# Patient Record
Sex: FEMALE | Race: OTHER | NOT HISPANIC OR LATINO
[De-identification: names, ages, dates, MRNs, and addresses within clinical notes are randomized per-mention and may not be internally consistent; named-entity substitution may affect disease eponyms.]

---

## 2023-12-12 ENCOUNTER — TRANSCRIPTION ENCOUNTER (OUTPATIENT)
Age: 23
End: 2023-12-12

## 2023-12-12 ENCOUNTER — ASOB RESULT (OUTPATIENT)
Age: 23
End: 2023-12-12

## 2023-12-12 ENCOUNTER — APPOINTMENT (OUTPATIENT)
Dept: ANTEPARTUM | Facility: CLINIC | Age: 23
End: 2023-12-12
Payer: MEDICAID

## 2023-12-12 PROCEDURE — 93976 VASCULAR STUDY: CPT

## 2023-12-12 PROCEDURE — 76813 OB US NUCHAL MEAS 1 GEST: CPT

## 2024-02-13 ENCOUNTER — APPOINTMENT (OUTPATIENT)
Dept: ANTEPARTUM | Facility: CLINIC | Age: 24
End: 2024-02-13
Payer: MEDICAID

## 2024-02-13 ENCOUNTER — ASOB RESULT (OUTPATIENT)
Age: 24
End: 2024-02-13

## 2024-02-13 PROCEDURE — 76817 TRANSVAGINAL US OBSTETRIC: CPT

## 2024-02-13 PROCEDURE — 76811 OB US DETAILED SNGL FETUS: CPT

## 2024-02-20 ENCOUNTER — APPOINTMENT (OUTPATIENT)
Dept: ANTEPARTUM | Facility: CLINIC | Age: 24
End: 2024-02-20
Payer: MEDICAID

## 2024-02-20 ENCOUNTER — ASOB RESULT (OUTPATIENT)
Age: 24
End: 2024-02-20

## 2024-02-20 PROCEDURE — 59000 AMNIOCENTESIS DIAGNOSTIC: CPT

## 2024-02-20 PROCEDURE — 76946 ECHO GUIDE FOR AMNIOCENTESIS: CPT

## 2024-02-27 ENCOUNTER — APPOINTMENT (OUTPATIENT)
Dept: PEDIATRIC CARDIOLOGY | Facility: CLINIC | Age: 24
End: 2024-02-27
Payer: MEDICAID

## 2024-02-27 PROBLEM — Z00.00 ENCOUNTER FOR PREVENTIVE HEALTH EXAMINATION: Status: ACTIVE | Noted: 2024-02-27

## 2024-02-27 PROCEDURE — 76827 ECHO EXAM OF FETAL HEART: CPT

## 2024-02-27 PROCEDURE — 76820 UMBILICAL ARTERY ECHO: CPT | Mod: 26

## 2024-02-27 PROCEDURE — 76821 MIDDLE CEREBRAL ARTERY ECHO: CPT | Mod: 26

## 2024-02-27 PROCEDURE — 99202 OFFICE O/P NEW SF 15 MIN: CPT

## 2024-02-27 PROCEDURE — 76825 ECHO EXAM OF FETAL HEART: CPT

## 2024-03-29 ENCOUNTER — APPOINTMENT (OUTPATIENT)
Dept: ANTEPARTUM | Facility: CLINIC | Age: 24
End: 2024-03-29

## 2024-03-29 ENCOUNTER — ASOB RESULT (OUTPATIENT)
Age: 24
End: 2024-03-29

## 2024-03-29 PROCEDURE — 76819 FETAL BIOPHYS PROFIL W/O NST: CPT | Mod: 1L

## 2024-03-29 PROCEDURE — 76816 OB US FOLLOW-UP PER FETUS: CPT | Mod: 1L

## 2024-03-29 PROCEDURE — 76820 UMBILICAL ARTERY ECHO: CPT | Mod: 1L,59

## 2024-04-26 ENCOUNTER — APPOINTMENT (OUTPATIENT)
Dept: ANTEPARTUM | Facility: CLINIC | Age: 24
End: 2024-04-26
Payer: MEDICAID

## 2024-04-26 ENCOUNTER — ASOB RESULT (OUTPATIENT)
Age: 24
End: 2024-04-26

## 2024-04-26 PROCEDURE — 76816 OB US FOLLOW-UP PER FETUS: CPT

## 2024-04-26 PROCEDURE — 76819 FETAL BIOPHYS PROFIL W/O NST: CPT

## 2024-04-26 PROCEDURE — 76820 UMBILICAL ARTERY ECHO: CPT | Mod: 59

## 2024-05-21 ENCOUNTER — OUTPATIENT (OUTPATIENT)
Dept: OUTPATIENT SERVICES | Facility: HOSPITAL | Age: 24
LOS: 1 days | End: 2024-05-21
Payer: COMMERCIAL

## 2024-05-21 VITALS
RESPIRATION RATE: 18 BRPM | HEART RATE: 124 BPM | OXYGEN SATURATION: 98 % | TEMPERATURE: 98 F | DIASTOLIC BLOOD PRESSURE: 83 MMHG | SYSTOLIC BLOOD PRESSURE: 152 MMHG

## 2024-05-21 DIAGNOSIS — O26.899 OTHER SPECIFIED PREGNANCY RELATED CONDITIONS, UNSPECIFIED TRIMESTER: ICD-10-CM

## 2024-05-21 LAB
ALBUMIN SERPL ELPH-MCNC: 3.9 G/DL — SIGNIFICANT CHANGE UP (ref 3.3–5)
ALP SERPL-CCNC: 158 U/L — HIGH (ref 40–120)
ALT FLD-CCNC: 10 U/L — SIGNIFICANT CHANGE UP (ref 10–45)
ANION GAP SERPL CALC-SCNC: 11 MMOL/L — SIGNIFICANT CHANGE UP (ref 5–17)
APTT BLD: 26 SEC — SIGNIFICANT CHANGE UP (ref 24.5–35.6)
AST SERPL-CCNC: 12 U/L — SIGNIFICANT CHANGE UP (ref 10–40)
BASOPHILS # BLD AUTO: 0.04 K/UL — SIGNIFICANT CHANGE UP (ref 0–0.2)
BASOPHILS NFR BLD AUTO: 0.3 % — SIGNIFICANT CHANGE UP (ref 0–2)
BILIRUB SERPL-MCNC: 0.2 MG/DL — SIGNIFICANT CHANGE UP (ref 0.2–1.2)
BLD GP AB SCN SERPL QL: NEGATIVE — SIGNIFICANT CHANGE UP
BUN SERPL-MCNC: 9 MG/DL — SIGNIFICANT CHANGE UP (ref 7–23)
CALCIUM SERPL-MCNC: 9.8 MG/DL — SIGNIFICANT CHANGE UP (ref 8.4–10.5)
CHLORIDE SERPL-SCNC: 105 MMOL/L — SIGNIFICANT CHANGE UP (ref 96–108)
CO2 SERPL-SCNC: 20 MMOL/L — LOW (ref 22–31)
CREAT ?TM UR-MCNC: 169 MG/DL — SIGNIFICANT CHANGE UP
CREAT SERPL-MCNC: 0.46 MG/DL — LOW (ref 0.5–1.3)
EGFR: 136 ML/MIN/1.73M2 — SIGNIFICANT CHANGE UP
EOSINOPHIL # BLD AUTO: 0.16 K/UL — SIGNIFICANT CHANGE UP (ref 0–0.5)
EOSINOPHIL NFR BLD AUTO: 1.1 % — SIGNIFICANT CHANGE UP (ref 0–6)
FIBRINOGEN PPP-MCNC: 464 MG/DL — HIGH (ref 200–445)
GLUCOSE SERPL-MCNC: 99 MG/DL — SIGNIFICANT CHANGE UP (ref 70–99)
HCT VFR BLD CALC: 34.2 % — LOW (ref 34.5–45)
HGB BLD-MCNC: 11.1 G/DL — LOW (ref 11.5–15.5)
IMM GRANULOCYTES NFR BLD AUTO: 1.1 % — HIGH (ref 0–0.9)
INR BLD: 0.9 — SIGNIFICANT CHANGE UP (ref 0.85–1.18)
LDH SERPL L TO P-CCNC: 162 U/L — SIGNIFICANT CHANGE UP (ref 50–242)
LYMPHOCYTES # BLD AUTO: 17.9 % — SIGNIFICANT CHANGE UP (ref 13–44)
LYMPHOCYTES # BLD AUTO: 2.69 K/UL — SIGNIFICANT CHANGE UP (ref 1–3.3)
MCHC RBC-ENTMCNC: 27 PG — SIGNIFICANT CHANGE UP (ref 27–34)
MCHC RBC-ENTMCNC: 32.5 GM/DL — SIGNIFICANT CHANGE UP (ref 32–36)
MCV RBC AUTO: 83.2 FL — SIGNIFICANT CHANGE UP (ref 80–100)
MONOCYTES # BLD AUTO: 1.04 K/UL — HIGH (ref 0–0.9)
MONOCYTES NFR BLD AUTO: 6.9 % — SIGNIFICANT CHANGE UP (ref 2–14)
NEUTROPHILS # BLD AUTO: 10.96 K/UL — HIGH (ref 1.8–7.4)
NEUTROPHILS NFR BLD AUTO: 72.7 % — SIGNIFICANT CHANGE UP (ref 43–77)
NRBC # BLD: 0 /100 WBCS — SIGNIFICANT CHANGE UP (ref 0–0)
PLATELET # BLD AUTO: 315 K/UL — SIGNIFICANT CHANGE UP (ref 150–400)
POTASSIUM SERPL-MCNC: 3.6 MMOL/L — SIGNIFICANT CHANGE UP (ref 3.5–5.3)
POTASSIUM SERPL-SCNC: 3.6 MMOL/L — SIGNIFICANT CHANGE UP (ref 3.5–5.3)
PROT ?TM UR-MCNC: 31 MG/DL — HIGH (ref 0–12)
PROT SERPL-MCNC: 6.9 G/DL — SIGNIFICANT CHANGE UP (ref 6–8.3)
PROT/CREAT UR-RTO: 0.2 RATIO — SIGNIFICANT CHANGE UP (ref 0–0.2)
PROTHROM AB SERPL-ACNC: 10.3 SEC — SIGNIFICANT CHANGE UP (ref 9.5–13)
RBC # BLD: 4.11 M/UL — SIGNIFICANT CHANGE UP (ref 3.8–5.2)
RBC # FLD: 15.4 % — HIGH (ref 10.3–14.5)
RH IG SCN BLD-IMP: POSITIVE — SIGNIFICANT CHANGE UP
RH IG SCN BLD-IMP: POSITIVE — SIGNIFICANT CHANGE UP
SODIUM SERPL-SCNC: 136 MMOL/L — SIGNIFICANT CHANGE UP (ref 135–145)
URATE SERPL-MCNC: 3.5 MG/DL — SIGNIFICANT CHANGE UP (ref 2.5–7)
WBC # BLD: 15.06 K/UL — HIGH (ref 3.8–10.5)
WBC # FLD AUTO: 15.06 K/UL — HIGH (ref 3.8–10.5)

## 2024-05-21 PROCEDURE — 59025 FETAL NON-STRESS TEST: CPT

## 2024-05-21 PROCEDURE — 93005 ELECTROCARDIOGRAM TRACING: CPT

## 2024-05-21 PROCEDURE — 85025 COMPLETE CBC W/AUTO DIFF WBC: CPT

## 2024-05-21 PROCEDURE — 80053 COMPREHEN METABOLIC PANEL: CPT

## 2024-05-21 PROCEDURE — 86780 TREPONEMA PALLIDUM: CPT

## 2024-05-21 PROCEDURE — 82570 ASSAY OF URINE CREATININE: CPT

## 2024-05-21 PROCEDURE — 76818 FETAL BIOPHYS PROFILE W/NST: CPT

## 2024-05-21 PROCEDURE — 86850 RBC ANTIBODY SCREEN: CPT

## 2024-05-21 PROCEDURE — 85384 FIBRINOGEN ACTIVITY: CPT

## 2024-05-21 PROCEDURE — 86900 BLOOD TYPING SEROLOGIC ABO: CPT

## 2024-05-21 PROCEDURE — 85730 THROMBOPLASTIN TIME PARTIAL: CPT

## 2024-05-21 PROCEDURE — 84550 ASSAY OF BLOOD/URIC ACID: CPT

## 2024-05-21 PROCEDURE — 99214 OFFICE O/P EST MOD 30 MIN: CPT

## 2024-05-21 PROCEDURE — 85610 PROTHROMBIN TIME: CPT

## 2024-05-21 PROCEDURE — 83615 LACTATE (LD) (LDH) ENZYME: CPT

## 2024-05-21 PROCEDURE — 86901 BLOOD TYPING SEROLOGIC RH(D): CPT

## 2024-05-21 PROCEDURE — 84156 ASSAY OF PROTEIN URINE: CPT

## 2024-05-21 PROCEDURE — 36415 COLL VENOUS BLD VENIPUNCTURE: CPT

## 2024-05-21 NOTE — OB PROVIDER TRIAGE NOTE - HISTORY OF PRESENT ILLNESS
Subjective:  HPI: 24y Female  at 35w and 6d who was sent by Dr. Dia for elevated BP in office. This morning around 10:00 AM patient went for routine prenatal visit and was found to have BP of 146/80's.  She reports having regular, rhythmic back pain that feels like contractions. She denies vision changes, headaches.    - Fetal movement: Active movement, unchanged from past week  - Contractions: Yes, pt reports back pain that feels like contractions  - Leakage of fluid: None  - Vaginal bleeding: None    Antepartum:  - Pregnancy type: Spontaneous  - Testing: NIPT wnl  - Anatomy scan: wnl  - GCT/GTT: Passed  - Hypertensive disorders of pregnancy: None documented  - GBS status:  - EFW:    - ObHx:   - GynHx: denies  - PMH: denies  - PSH: denies  - SH: denies toxic habits  - Meds: Prenatal vitamins  - Allergies: Allergy Status Unknown          Objective:    T(C): 36.6 (24 @ 13:02), Max: 36.6 (24 @ 13:02)  HR: 124 (24 @ 13:02) (124 - 124)  BP: 152/83 (24 @ 13:02) (152/83 - 152/83)  RR: 18 (24 @ 13:02) (18 - 18)  SpO2: 98% (24 @ 13:02) (98% - 98%)    General: No apparent distress; Lying comfortably in bed  Pulmonary: No increased work of breathing  Abdomen: Soft; Nontender; Gravid  Extremities: Trace pedal edema bilaterally; No calf tenderness bilaterally  Neurological: Gross movements intact  Psychiatric: Appropriate mood and affect  Skin: No rashes or jaundice    SVE:   TAUS:   (sonogram attached)  TVUS:   (sonogram attached)    FHT:   TOCO:     Labs / Studies:                      Assessment:        Plan:  - Admit to L&D floor  - Labs sent:  - Diet:  - Continuous electronic fetal monitoring and tocometry  - Pain management with  - GBS status as above:   - All questions were answered and concerns addressed. Patient verbally expressed understanding.  - Discussed with senior resident   - Discussed with attending physician     Subjective:  HPI: 24y Female  at 35w and 6d who was sent by Dr. Dia for elevated BP in office. This morning around 10:00 AM patient went for routine prenatal visit and was found to have BP of 146/80's mmHg. Since early pregnancy she states that she has had high BPs and recalls 24-hour urine collection, which was normal. She has been taking daily readings at home and has not seen any values above 160/110 mmHg. Patient reports high BP during last pregnancy in , but does not believe she was administered Mg. She reports having regular, rhythmic back pain that feels like contractions. She denies vision changes, headaches, RUQ pain, or shortness of breath.         - Fetal movement: Active movement, unchanged from past week  - Contractions: Yes, pt reports back pain that feels like contractions  - Leakage of fluid: None  - Vaginal bleeding: None    Antepartum:  - Pregnancy type: Spontaneous  - Testing: NIPT wnl  - Anatomy scan: wnl  - GCT/GTT: Passed  - Hypertensive disorders of pregnancy: Yes throughout this pregnancy and previous pregnancy in , never above 160/110  - GBS status: Unknown, but drawn by Dr. Dia  - EFW: 1810g at 32 weeks    - ObHx: G1)  induced vaginal delivery at 37w 3700g G2) current  - GynHx: Tested positive for herpes simplex virus on routine screening, denies eruptions. Given Valtrex PO, but only took one dose 3 days ago.  - PMH: denies  - PSH: denies  - SH: denies toxic habits  - Meds: Prenatal vitamins  - Allergies: NKA          Objective:    T(C): 36.6 (24 @ 13:02), Max: 36.6 (24 @ 13:02)  HR: 124 (24 @ 13:02) (124 - 124)  BP: 152/83 (24 @ 13:02) (152/83 - 152/83)  RR: 18 (24 @ 13:02) (18 - 18)  SpO2: 98% (24 @ 13:02) (98% - 98%)    General: No apparent distress; Lying comfortably in bed  Pulmonary: No increased work of breathing  Abdomen: Soft; Nontender; Gravid  Extremities: No pedal edema bilaterally; No calf tenderness bilaterally  Neurological: Gross movements intact  Psychiatric: Appropriate mood and affect  Skin: No rashes or jaundice    SVE:   TAUS: Cephalic, anterior placenta. BPP 8/8, LISSET 13.5   (sonogram attached)      FHT: baseline 150, moderate variability, +accels, -deccels, overall reactive and reassuring  TOCO: Uterine irregularity vs. spontaneous uterine contractions    Labs / Studies:                      Assessment: This is a 24y Female  at 35w and 6d sent by Dr. Dia for BP concerns in office. Repeat vital signs in L&D reveals elevated BPs highest of 153/82, which combined with         Plan:  - Admit to L&D floor  - Labs sent:  - Diet:  - Continuous electronic fetal monitoring and tocometry  - Pain management with  - GBS status as above:   - All questions were answered and concerns addressed. Patient verbally expressed understanding.  - Discussed with senior resident   - Discussed with attending physician     Subjective:  HPI: 24y Female  at 35w and 6d who was sent by Dr. Dia for elevated BP in office. This morning around 10:00 AM patient went for routine prenatal visit and was found to have BP of 146/80's mmHg. Since early pregnancy she states that she has had high BPs and recalls 24-hour urine collection, which was normal. She has been taking daily readings at home and has not seen any values above 160/110 mmHg. Patient reports high BP during last pregnancy in , but does not believe she was administered Mg. She reports having regular, rhythmic back pain that feels like contractions. She denies vision changes, headaches, RUQ pain, or shortness of breath.         - Fetal movement: Active movement, unchanged from past week  - Contractions: Yes, pt reports back pain that feels like contractions  - Leakage of fluid: None  - Vaginal bleeding: None    Antepartum:  - Pregnancy type: Spontaneous  - Testing: NIPT wnl  - Anatomy scan: wnl  - GCT/GTT: Passed  - Hypertensive disorders of pregnancy: Yes throughout this pregnancy and previous pregnancy in , never above 160/110  - GBS status: Unknown, but drawn by Dr. Dia  - EFW: 1810g at 32 weeks    - ObHx: G1)  induced vaginal delivery at 37w 3700g G2) current  - GynHx: Tested positive for herpes simplex virus on routine screening, denies eruptions. Given Valtrex PO, but only took one dose 3 days ago.  - PMH: denies  - PSH: denies  - SH: denies toxic habits  - Meds: Prenatal vitamins  - Allergies: NKA          Objective:    T(C): 36.6 (24 @ 13:02), Max: 36.6 (24 @ 13:02)  HR: 124 (24 @ 13:02) (124 - 124)  BP: 152/83 (24 @ 13:02) (152/83 - 152/83)  RR: 18 (24 @ 13:02) (18 - 18)  SpO2: 98% (24 @ 13:02) (98% - 98%)    General: No apparent distress; Lying comfortably in bed  Pulmonary: No increased work of breathing  Abdomen: Soft; Nontender; Gravid  Extremities: No pedal edema bilaterally; No calf tenderness bilaterally  Neurological: Gross movements intact  Psychiatric: Appropriate mood and affect  Skin: No rashes or jaundice    SVE:   TAUS: Cephalic, anterior placenta. BPP 8/8, LISSET 13.5   (sonogram attached)      FHT: baseline 150, moderate variability, +accels, -deccels, overall reactive and reassuring  TOCO: Uterine irregularity vs. spontaneous uterine contractions    Labs / Studies:                      Assessment: This is a 24y Female  at 35w and 6d sent by Dr. Dia for BP concerns in office. Repeat vital signs in L&D reveals elevated BPs highest of 153/82, which combined with proteinuria is concerning for pre eclampsia with mild features. Patient's exam is stable and she will be discharged home, but is advised to return should BP exceed 160/110 or if she experiences vision disturbances, neurological changes. Patient was counseled regarding adherence to valtrex and home BP measurements.         Plan:  - Discharge to home, with strict return guidance if BP surpasses 160/110  - Labs sent: CBC w/ diff, CMP, Urinalysis, Coags, Type/Screen  - Pt was counseled regarding strict adherence to Valtrex  - Pt advised to check BP at home twice daily  - All questions were answered and concerns addressed. Patient verbally expressed understanding.  - Discussed with senior resident Dr. Humphries  - Discussed with attending physician ?    Written by George Olmstead ([facility] MS3)     24y Female  at 35w6d presents from office for increased BPs in the office of 146/80's, patient has known history of gHTN. She has been taking daily readings at home and has not seen any values above 160/110 mmHg. She denies headache, blurry vision, SOB, RUQ pain, edema. Also denies contractions, LOF, vaginal bleeding. Endorses fetal movement.    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  - PMH: denies  - PSH: denies  - SH: denies toxic habits  - Meds: Prenatal vitamins  - Allergies: NKA          Objective:    T(C): 36.6 (24 @ 13:02), Max: 36.6 (24 @ 13:02)  HR: 124 (24 @ 13:02) (124 - 124)  BP: 152/83 (24 @ 13:02) (152/83 - 152/83)  RR: 18 (24 @ 13:02) (18 - 18)  SpO2: 98% (24 @ 13:02) (98% - 98%)    General: No apparent distress; Lying comfortably in bed  Pulmonary: No increased work of breathing  Abdomen: Soft; Nontender; Gravid  Extremities: No pedal edema bilaterally; No calf tenderness bilaterally  Neurological: Gross movements intact  Psychiatric: Appropriate mood and affect  Skin: No rashes or jaundice    TAUS: Cephalic, anterior placenta. BPP 8/8, LISSET 13.5   (sonogram attached)    FHT: baseline 140, moderate variability, +accels, -deccels, overall reactive and reassuring  TOCO: irregular ctx    A/P  24y Female  at 35w6d presents for r/o PEC. Patient has no toxic complaints, is having mild range BPs in triage, no severe range BPs. Tachycardic which is unchanged from vitals during prenatal visits. VSS, ekg w/ sinus tachycardia, NST is reactive and reassuring, BPP 8/8, CBC and CMP wnl, P:C 0.2 Patient is taking her BPs at home and denies any severe range pressures. Has induction scheduled for next well. Will send home with return PTL and PEC precautions. All questions answered, patient expressed understanding. Discussed with senior resident Dr. Turner and attending Dr. Beatty    Written w/ help by George Olmstead ([facility] MS3)

## 2024-05-21 NOTE — OB RN TRIAGE NOTE - NSMATERNALFETALCONCERNS_OBGYN_ALL_OB_FT
Fetal Alert  24: Fetal echo 24 revealed Structurally normal fetal heart.Fetal heart rhythm during exam: normal fetal rhythm and occasional premature atrial contractions.Suggest a  electrocardiogram on the baby to evaluate the rhythm prior to discharge from  nursery.Shelley Rankin RN.

## 2024-05-21 NOTE — OB RN TRIAGE NOTE - CHIEF COMPLAINT QUOTE
I had three elevated blood pressures at the office today and have been having headaches for the past week.

## 2024-05-21 NOTE — OB RN TRIAGE NOTE - NSNURSINGINSTR_OBGYN_ALL_OB_FT
Patient discharged to home; patient provided with discharge instructions by Dr. Trejo. Patient verbally indicates understanding of discharge instructions; Vital signs wnl. Patient ambulated off unit in stable condition.

## 2024-05-21 NOTE — OB RN TRIAGE NOTE - FALL HARM RISK - UNIVERSAL INTERVENTIONS
Bed in lowest position, wheels locked, appropriate side rails in place/Call bell, personal items and telephone in reach/Instruct patient to call for assistance before getting out of bed or chair/Non-slip footwear when patient is out of bed/[place] to call system/Physically safe environment - no spills, clutter or unnecessary equipment/Purposeful Proactive Rounding/Room/bathroom lighting operational, light cord in reach

## 2024-05-22 LAB — T PALLIDUM AB TITR SER: NEGATIVE — SIGNIFICANT CHANGE UP

## 2024-05-23 DIAGNOSIS — O26.893 OTHER SPECIFIED PREGNANCY RELATED CONDITIONS, THIRD TRIMESTER: ICD-10-CM

## 2024-05-23 DIAGNOSIS — O13.3 GESTATIONAL [PREGNANCY-INDUCED] HYPERTENSION WITHOUT SIGNIFICANT PROTEINURIA, THIRD TRIMESTER: ICD-10-CM

## 2024-05-23 DIAGNOSIS — Z3A.35 35 WEEKS GESTATION OF PREGNANCY: ICD-10-CM

## 2024-05-23 DIAGNOSIS — R00.0 TACHYCARDIA, UNSPECIFIED: ICD-10-CM

## 2024-05-28 ENCOUNTER — OUTPATIENT (OUTPATIENT)
Dept: OUTPATIENT SERVICES | Facility: HOSPITAL | Age: 24
LOS: 1 days | End: 2024-05-28

## 2024-05-28 DIAGNOSIS — Z01.818 ENCOUNTER FOR OTHER PREPROCEDURAL EXAMINATION: ICD-10-CM

## 2024-05-28 PROBLEM — Z78.9 OTHER SPECIFIED HEALTH STATUS: Chronic | Status: ACTIVE | Noted: 2024-05-21

## 2024-05-28 LAB
ANION GAP SERPL CALC-SCNC: 12 MMOL/L — SIGNIFICANT CHANGE UP (ref 5–17)
BASOPHILS # BLD AUTO: 0.05 K/UL — SIGNIFICANT CHANGE UP (ref 0–0.2)
BASOPHILS NFR BLD AUTO: 0.3 % — SIGNIFICANT CHANGE UP (ref 0–2)
BLD GP AB SCN SERPL QL: NEGATIVE — SIGNIFICANT CHANGE UP
BUN SERPL-MCNC: 8 MG/DL — SIGNIFICANT CHANGE UP (ref 7–23)
CALCIUM SERPL-MCNC: 10.4 MG/DL — SIGNIFICANT CHANGE UP (ref 8.4–10.5)
CHLORIDE SERPL-SCNC: 103 MMOL/L — SIGNIFICANT CHANGE UP (ref 96–108)
CO2 SERPL-SCNC: 22 MMOL/L — SIGNIFICANT CHANGE UP (ref 22–31)
CREAT SERPL-MCNC: 0.57 MG/DL — SIGNIFICANT CHANGE UP (ref 0.5–1.3)
EGFR: 129 ML/MIN/1.73M2 — SIGNIFICANT CHANGE UP
EOSINOPHIL # BLD AUTO: 0.1 K/UL — SIGNIFICANT CHANGE UP (ref 0–0.5)
EOSINOPHIL NFR BLD AUTO: 0.6 % — SIGNIFICANT CHANGE UP (ref 0–6)
GLUCOSE SERPL-MCNC: 66 MG/DL — LOW (ref 70–99)
HCT VFR BLD CALC: 37.1 % — SIGNIFICANT CHANGE UP (ref 34.5–45)
HGB BLD-MCNC: 11.9 G/DL — SIGNIFICANT CHANGE UP (ref 11.5–15.5)
IMM GRANULOCYTES NFR BLD AUTO: 1.8 % — HIGH (ref 0–0.9)
LYMPHOCYTES # BLD AUTO: 16.7 % — SIGNIFICANT CHANGE UP (ref 13–44)
LYMPHOCYTES # BLD AUTO: 2.78 K/UL — SIGNIFICANT CHANGE UP (ref 1–3.3)
MCHC RBC-ENTMCNC: 27.4 PG — SIGNIFICANT CHANGE UP (ref 27–34)
MCHC RBC-ENTMCNC: 32.1 GM/DL — SIGNIFICANT CHANGE UP (ref 32–36)
MCV RBC AUTO: 85.5 FL — SIGNIFICANT CHANGE UP (ref 80–100)
MONOCYTES # BLD AUTO: 1.23 K/UL — HIGH (ref 0–0.9)
MONOCYTES NFR BLD AUTO: 7.4 % — SIGNIFICANT CHANGE UP (ref 2–14)
NEUTROPHILS # BLD AUTO: 12.23 K/UL — HIGH (ref 1.8–7.4)
NEUTROPHILS NFR BLD AUTO: 73.2 % — SIGNIFICANT CHANGE UP (ref 43–77)
NRBC # BLD: 0 /100 WBCS — SIGNIFICANT CHANGE UP (ref 0–0)
PLATELET # BLD AUTO: 333 K/UL — SIGNIFICANT CHANGE UP (ref 150–400)
POTASSIUM SERPL-MCNC: 4.4 MMOL/L — SIGNIFICANT CHANGE UP (ref 3.5–5.3)
POTASSIUM SERPL-SCNC: 4.4 MMOL/L — SIGNIFICANT CHANGE UP (ref 3.5–5.3)
RBC # BLD: 4.34 M/UL — SIGNIFICANT CHANGE UP (ref 3.8–5.2)
RBC # FLD: 15.6 % — HIGH (ref 10.3–14.5)
RH IG SCN BLD-IMP: POSITIVE — SIGNIFICANT CHANGE UP
SODIUM SERPL-SCNC: 137 MMOL/L — SIGNIFICANT CHANGE UP (ref 135–145)
WBC # BLD: 16.69 K/UL — HIGH (ref 3.8–10.5)
WBC # FLD AUTO: 16.69 K/UL — HIGH (ref 3.8–10.5)

## 2024-05-28 PROCEDURE — 86900 BLOOD TYPING SEROLOGIC ABO: CPT

## 2024-05-28 PROCEDURE — 86901 BLOOD TYPING SEROLOGIC RH(D): CPT

## 2024-05-28 PROCEDURE — 86850 RBC ANTIBODY SCREEN: CPT

## 2024-05-28 PROCEDURE — 85025 COMPLETE CBC W/AUTO DIFF WBC: CPT

## 2024-05-28 PROCEDURE — 80048 BASIC METABOLIC PNL TOTAL CA: CPT

## 2024-05-30 ENCOUNTER — INPATIENT (INPATIENT)
Facility: HOSPITAL | Age: 24
LOS: 2 days | Discharge: HOME | End: 2024-06-02
Attending: OBSTETRICS & GYNECOLOGY | Admitting: OBSTETRICS & GYNECOLOGY
Payer: COMMERCIAL

## 2024-05-30 RX ORDER — CITRIC ACID/SODIUM CITRATE 300-500 MG
15 SOLUTION, ORAL ORAL EVERY 6 HOURS
Refills: 0 | Status: DISCONTINUED | OUTPATIENT
Start: 2024-05-30 | End: 2024-05-31

## 2024-05-30 RX ORDER — OXYTOCIN 10 UNIT/ML
333.33 VIAL (ML) INJECTION
Qty: 20 | Refills: 0 | Status: DISCONTINUED | OUTPATIENT
Start: 2024-05-30 | End: 2024-05-31

## 2024-05-30 RX ORDER — CHLORHEXIDINE GLUCONATE 213 G/1000ML
1 SOLUTION TOPICAL DAILY
Refills: 0 | Status: DISCONTINUED | OUTPATIENT
Start: 2024-05-30 | End: 2024-05-31

## 2024-05-30 RX ORDER — SODIUM CHLORIDE 9 MG/ML
1000 INJECTION, SOLUTION INTRAVENOUS
Refills: 0 | Status: DISCONTINUED | OUTPATIENT
Start: 2024-05-30 | End: 2024-05-31

## 2024-05-31 VITALS
HEART RATE: 106 BPM | OXYGEN SATURATION: 100 % | DIASTOLIC BLOOD PRESSURE: 96 MMHG | HEIGHT: 64 IN | WEIGHT: 190.04 LBS | TEMPERATURE: 98 F | RESPIRATION RATE: 18 BRPM | SYSTOLIC BLOOD PRESSURE: 149 MMHG

## 2024-05-31 LAB
ALBUMIN SERPL ELPH-MCNC: 3.8 G/DL — SIGNIFICANT CHANGE UP (ref 3.3–5)
ALP SERPL-CCNC: 177 U/L — HIGH (ref 40–120)
ALT FLD-CCNC: 6 U/L — LOW (ref 10–45)
ANION GAP SERPL CALC-SCNC: 12 MMOL/L — SIGNIFICANT CHANGE UP (ref 5–17)
APTT BLD: 25 SEC — SIGNIFICANT CHANGE UP (ref 24.5–35.6)
AST SERPL-CCNC: 14 U/L — SIGNIFICANT CHANGE UP (ref 10–40)
BASOPHILS # BLD AUTO: 0.05 K/UL — SIGNIFICANT CHANGE UP (ref 0–0.2)
BASOPHILS NFR BLD AUTO: 0.3 % — SIGNIFICANT CHANGE UP (ref 0–2)
BILIRUB SERPL-MCNC: 0.3 MG/DL — SIGNIFICANT CHANGE UP (ref 0.2–1.2)
BLD GP AB SCN SERPL QL: NEGATIVE — SIGNIFICANT CHANGE UP
BUN SERPL-MCNC: 9 MG/DL — SIGNIFICANT CHANGE UP (ref 7–23)
CALCIUM SERPL-MCNC: 9.8 MG/DL — SIGNIFICANT CHANGE UP (ref 8.4–10.5)
CHLORIDE SERPL-SCNC: 109 MMOL/L — HIGH (ref 96–108)
CO2 SERPL-SCNC: 20 MMOL/L — LOW (ref 22–31)
CREAT SERPL-MCNC: 0.5 MG/DL — SIGNIFICANT CHANGE UP (ref 0.5–1.3)
EGFR: 133 ML/MIN/1.73M2 — SIGNIFICANT CHANGE UP
EOSINOPHIL # BLD AUTO: 0.15 K/UL — SIGNIFICANT CHANGE UP (ref 0–0.5)
EOSINOPHIL NFR BLD AUTO: 0.9 % — SIGNIFICANT CHANGE UP (ref 0–6)
FIBRINOGEN PPP-MCNC: 441 MG/DL — SIGNIFICANT CHANGE UP (ref 200–445)
GLUCOSE SERPL-MCNC: 95 MG/DL — SIGNIFICANT CHANGE UP (ref 70–99)
HCT VFR BLD CALC: 35 % — SIGNIFICANT CHANGE UP (ref 34.5–45)
HGB BLD-MCNC: 11 G/DL — LOW (ref 11.5–15.5)
IMM GRANULOCYTES NFR BLD AUTO: 1 % — HIGH (ref 0–0.9)
INR BLD: 0.93 — SIGNIFICANT CHANGE UP (ref 0.85–1.18)
LDH SERPL L TO P-CCNC: 206 U/L — SIGNIFICANT CHANGE UP (ref 50–242)
LYMPHOCYTES # BLD AUTO: 19.4 % — SIGNIFICANT CHANGE UP (ref 13–44)
LYMPHOCYTES # BLD AUTO: 3.15 K/UL — SIGNIFICANT CHANGE UP (ref 1–3.3)
MCHC RBC-ENTMCNC: 27.2 PG — SIGNIFICANT CHANGE UP (ref 27–34)
MCHC RBC-ENTMCNC: 31.4 GM/DL — LOW (ref 32–36)
MCV RBC AUTO: 86.4 FL — SIGNIFICANT CHANGE UP (ref 80–100)
MONOCYTES # BLD AUTO: 1.3 K/UL — HIGH (ref 0–0.9)
MONOCYTES NFR BLD AUTO: 8 % — SIGNIFICANT CHANGE UP (ref 2–14)
NEUTROPHILS # BLD AUTO: 11.43 K/UL — HIGH (ref 1.8–7.4)
NEUTROPHILS NFR BLD AUTO: 70.4 % — SIGNIFICANT CHANGE UP (ref 43–77)
NRBC # BLD: 0 /100 WBCS — SIGNIFICANT CHANGE UP (ref 0–0)
PLATELET # BLD AUTO: 321 K/UL — SIGNIFICANT CHANGE UP (ref 150–400)
POTASSIUM SERPL-MCNC: 4 MMOL/L — SIGNIFICANT CHANGE UP (ref 3.5–5.3)
POTASSIUM SERPL-SCNC: 4 MMOL/L — SIGNIFICANT CHANGE UP (ref 3.5–5.3)
PROT SERPL-MCNC: 6.6 G/DL — SIGNIFICANT CHANGE UP (ref 6–8.3)
PROTHROM AB SERPL-ACNC: 10.6 SEC — SIGNIFICANT CHANGE UP (ref 9.5–13)
RBC # BLD: 4.05 M/UL — SIGNIFICANT CHANGE UP (ref 3.8–5.2)
RBC # FLD: 15.4 % — HIGH (ref 10.3–14.5)
RH IG SCN BLD-IMP: POSITIVE — SIGNIFICANT CHANGE UP
SODIUM SERPL-SCNC: 141 MMOL/L — SIGNIFICANT CHANGE UP (ref 135–145)
T PALLIDUM AB TITR SER: NEGATIVE — SIGNIFICANT CHANGE UP
URATE SERPL-MCNC: 3.8 MG/DL — SIGNIFICANT CHANGE UP (ref 2.5–7)
WBC # BLD: 16.25 K/UL — HIGH (ref 3.8–10.5)
WBC # FLD AUTO: 16.25 K/UL — HIGH (ref 3.8–10.5)

## 2024-05-31 RX ORDER — BENZOCAINE 10 %
1 GEL (GRAM) MUCOUS MEMBRANE EVERY 6 HOURS
Refills: 0 | Status: DISCONTINUED | OUTPATIENT
Start: 2024-05-31 | End: 2024-06-02

## 2024-05-31 RX ORDER — LANOLIN
1 OINTMENT (GRAM) TOPICAL EVERY 6 HOURS
Refills: 0 | Status: DISCONTINUED | OUTPATIENT
Start: 2024-05-31 | End: 2024-06-02

## 2024-05-31 RX ORDER — SODIUM CHLORIDE 9 MG/ML
3 INJECTION INTRAMUSCULAR; INTRAVENOUS; SUBCUTANEOUS EVERY 8 HOURS
Refills: 0 | Status: DISCONTINUED | OUTPATIENT
Start: 2024-05-31 | End: 2024-06-02

## 2024-05-31 RX ORDER — AER TRAVELER 0.5 G/1
1 SOLUTION RECTAL; TOPICAL EVERY 4 HOURS
Refills: 0 | Status: DISCONTINUED | OUTPATIENT
Start: 2024-05-31 | End: 2024-06-02

## 2024-05-31 RX ORDER — ACETAMINOPHEN 500 MG
975 TABLET ORAL
Refills: 0 | Status: DISCONTINUED | OUTPATIENT
Start: 2024-05-31 | End: 2024-06-02

## 2024-05-31 RX ORDER — SIMETHICONE 80 MG/1
80 TABLET, CHEWABLE ORAL EVERY 4 HOURS
Refills: 0 | Status: DISCONTINUED | OUTPATIENT
Start: 2024-05-31 | End: 2024-06-02

## 2024-05-31 RX ORDER — HYDROCORTISONE 1 %
1 OINTMENT (GRAM) TOPICAL EVERY 6 HOURS
Refills: 0 | Status: DISCONTINUED | OUTPATIENT
Start: 2024-05-31 | End: 2024-06-02

## 2024-05-31 RX ORDER — OXYCODONE HYDROCHLORIDE 5 MG/1
5 TABLET ORAL
Refills: 0 | Status: DISCONTINUED | OUTPATIENT
Start: 2024-05-31 | End: 2024-06-02

## 2024-05-31 RX ORDER — FENTANYL/BUPIVACAINE/NS/PF 2MCG/ML-.1
250 PLASTIC BAG, INJECTION (ML) INJECTION
Refills: 0 | Status: DISCONTINUED | OUTPATIENT
Start: 2024-05-31 | End: 2024-05-31

## 2024-05-31 RX ORDER — TETANUS TOXOID, REDUCED DIPHTHERIA TOXOID AND ACELLULAR PERTUSSIS VACCINE, ADSORBED 5; 2.5; 8; 8; 2.5 [IU]/.5ML; [IU]/.5ML; UG/.5ML; UG/.5ML; UG/.5ML
0.5 SUSPENSION INTRAMUSCULAR ONCE
Refills: 0 | Status: DISCONTINUED | OUTPATIENT
Start: 2024-05-31 | End: 2024-06-02

## 2024-05-31 RX ORDER — PRAMOXINE HYDROCHLORIDE 150 MG/15G
1 AEROSOL, FOAM RECTAL EVERY 4 HOURS
Refills: 0 | Status: DISCONTINUED | OUTPATIENT
Start: 2024-05-31 | End: 2024-06-02

## 2024-05-31 RX ORDER — KETOROLAC TROMETHAMINE 30 MG/ML
30 SYRINGE (ML) INJECTION ONCE
Refills: 0 | Status: DISCONTINUED | OUTPATIENT
Start: 2024-05-31 | End: 2024-05-31

## 2024-05-31 RX ORDER — MAGNESIUM HYDROXIDE 400 MG/1
30 TABLET, CHEWABLE ORAL
Refills: 0 | Status: DISCONTINUED | OUTPATIENT
Start: 2024-05-31 | End: 2024-06-02

## 2024-05-31 RX ORDER — DIPHENHYDRAMINE HCL 50 MG
25 CAPSULE ORAL EVERY 6 HOURS
Refills: 0 | Status: DISCONTINUED | OUTPATIENT
Start: 2024-05-31 | End: 2024-06-02

## 2024-05-31 RX ORDER — DIBUCAINE 1 %
1 OINTMENT (GRAM) RECTAL EVERY 6 HOURS
Refills: 0 | Status: DISCONTINUED | OUTPATIENT
Start: 2024-05-31 | End: 2024-06-02

## 2024-05-31 RX ORDER — IBUPROFEN 200 MG
600 TABLET ORAL EVERY 6 HOURS
Refills: 0 | Status: COMPLETED | OUTPATIENT
Start: 2024-05-31 | End: 2025-04-29

## 2024-05-31 RX ORDER — OXYCODONE HYDROCHLORIDE 5 MG/1
5 TABLET ORAL ONCE
Refills: 0 | Status: DISCONTINUED | OUTPATIENT
Start: 2024-05-31 | End: 2024-06-02

## 2024-05-31 RX ORDER — OXYTOCIN 10 UNIT/ML
2 VIAL (ML) INJECTION
Qty: 30 | Refills: 0 | Status: DISCONTINUED | OUTPATIENT
Start: 2024-05-31 | End: 2024-05-31

## 2024-05-31 RX ORDER — OXYTOCIN 10 UNIT/ML
41.67 VIAL (ML) INJECTION
Qty: 20 | Refills: 0 | Status: DISCONTINUED | OUTPATIENT
Start: 2024-05-31 | End: 2024-06-02

## 2024-05-31 RX ADMIN — Medication 2 MILLIUNIT(S)/MIN: at 05:08

## 2024-05-31 RX ADMIN — SODIUM CHLORIDE 125 MILLILITER(S): 9 INJECTION, SOLUTION INTRAVENOUS at 11:52

## 2024-05-31 RX ADMIN — SODIUM CHLORIDE 125 MILLILITER(S): 9 INJECTION, SOLUTION INTRAVENOUS at 05:40

## 2024-05-31 RX ADMIN — SODIUM CHLORIDE 3 MILLILITER(S): 9 INJECTION INTRAMUSCULAR; INTRAVENOUS; SUBCUTANEOUS at 22:03

## 2024-05-31 RX ADMIN — Medication 30 MILLIGRAM(S): at 20:40

## 2024-05-31 RX ADMIN — Medication 30 MILLIGRAM(S): at 21:54

## 2024-05-31 NOTE — OB RN DELIVERY SUMMARY - NSSELHIDDEN_OBGYN_ALL_OB_FT
[NS_[email or web address]_ALL_OB_FT:[id number]],[NS_DeliveryAssist1_OBGYN_ALL_OB_FT:[id number]],[NS_DeliveryRN_OBGYN_ALL_OB_FT:[id number]]

## 2024-05-31 NOTE — OB PROVIDER H&P - HISTORY OF PRESENT ILLNESS
SHEYLA BARRAGAN is a 25yo  at 37+2 presenting for  IOL secondary to chronic hypertension. patient states that she had her first elevated blood pressure in the first trimester of this pregnancy. Denies LOF, VB, contractions. +FM.     Ante: Spontaneous conception. NIPT low risk. Anatomy scan significant for VSD. Fetal echo revealed VSD resolved. PACs during fetal echo, requiring pediatric evaluation after delivery. Passed GCT. GBS negative.  EFW 3400g. Fetal Alert  24: Fetal echo 24 revealed Structurally normal fetal heart.Fetal heart rhythm during exam: normal fetal rhythm and occasional premature atrial contractions.Suggest a  electrocardiogram on the baby to evaluate the rhythm prior to discharge from  nursery.    OBHx: G1 -  . complicated by gestational hypertension, discharged home on Labetalol. 3620g  G2 - current   GYNHx: denies fibroids, cysts,  abnormal pap. History of HSV on serology. Never out genital outbreak. Currently on Valtrex for prophylaxis.     PMH: chronic hypertension   PSH: denies   Meds: PNV, ASA x 2, Valtrex 500mg BID   Allergies: NKDA     PE:  T(C): --  HR: --  BP: --  RR: --  SpO2: --  GEN: well-appearing, NAD  PULM: no increased WOB  ABD: soft, nontender, gravid  EXT: mild LE edema  TAUS: cephalic (image attached)     FHT: baseline 140, moderate variability, +accels, no decels  TOCO: no ctx   reactive & reassuring     A&P: 25yo  at 37+2 presents for IOL secondary to chronic HTN. Fetal status reassuring.   - Admit to L&D for IOL secondary to chronic hypertension   - Consents signed  - PN reviewed, GBS negative   - NPO, IVF  - continuous tocometry, FHT   - IOL with kristian and pitocin     D/W Dr. Perrin   D/W Dr. Perdomo

## 2024-05-31 NOTE — OB PROVIDER DELIVERY SUMMARY - NSSELHIDDEN_OBGYN_ALL_OB_FT
[NS_DeliveryAttending1_OBGYN_ALL_OB_FT:[id number]],[NS_DeliveryAssist1_OBGYN_ALL_OB_FT:[id number]],[NS_DeliveryRN_OBGYN_ALL_OB_FT:[id number]]

## 2024-05-31 NOTE — OB PROVIDER LABOR PROGRESS NOTE - NS_OBIHIFHRDETAILS_OBGYN_ALL_OB_FT
baseline 135; moderate variability; +accels; -decels; category I tracing
baseline 140; moderate variability; +accels; non recurrent variable decels; category II tracing overall reassuring with accels and moderate variability
baseline 140; moderate variability; +accels; -decels; category I tracing
baseline 140, moderate variabilit,y +accels, no decels.

## 2024-05-31 NOTE — OB NEONATOLOGY/PEDIATRICIAN DELIVERY SUMMARY - NSPEDSNEONOTESA_OBGYN_ALL_OB_FT
NICU called to delivery for PACs on prenatal echo. Baby emerged with good tone, crying, vigorous. DCC x 60 sec. Infant placed on open warmer, dried, suctioned, stimulated. PE WNL. Cleared for WBN, at this time.

## 2024-05-31 NOTE — OB PROVIDER H&P - NSLOWPPHRISK_OBGYN_A_OB
No previous uterine incision/[place] Pregnancy/Less than or equal to 4 previous vaginal births/No known bleeding disorder/No history of postpartum hemorrhage/No other PPH risks indicated

## 2024-05-31 NOTE — OB PROVIDER DELIVERY SUMMARY - NSPROVIDERDELIVERYNOTE_OBGYN_ALL_OB_FT
Patient 23 yo  at 37w2d  Presented for induction of labor for chronic hypertension. She was induced with cervical balloon and pitocin, received an epidural, and underwent amniotomy  Became fully dilated at 1527  Experienced  at 1646  Delivered spontaneously a  vigorous female infant in ROP position with APGARs 9/9  Placenta delivered spontaneously intact with 3VC.  Cervix was inspected and found intact.  A second degree laceration was repaired with 2-0 Vicryl   Hemostasis achieved.  EBL 150cc  Patient tolerated procedure well.  Patient and infant in stable conditions.  Dr. Larios present throughout procedure.

## 2024-05-31 NOTE — OB PROVIDER LABOR PROGRESS NOTE - NS_OBIHICONTRACTIONPATTERNDETAILS_OBGYN_ALL_OB_FT
irregular contractions 3-4 in 10min
irregular contractions 1-2 in 10min
irregular contractions 4-5 in 10min
arnold 2 in 10 minutes.

## 2024-05-31 NOTE — OB PROVIDER LABOR PROGRESS NOTE - ASSESSMENT
- Category 1   - Pitocin currently at 4mu. Will titrate as tolerated. 
- Category II tracing overall reassuring  - PItocin decreased to 6mU/min; continue to titrate as needed to maintain 4 contractions in 10min and overall reassuring fetal status  - Next exam in ~2hours or if rapid change
- Category I tracing  - Pitocin 2mU/min; continue to titrate as needed to attain contractions in 10min  - Next exam in 4 hours or if rapid change
- Category I tracing  - Pitocin 8mU/min; continue to titrate as needed to maintain contractions 4 in 10min  - Next exam ~1330

## 2024-05-31 NOTE — OB PROVIDER LABOR PROGRESS NOTE - NS_SUBJECTIVE/OBJECTIVE_OBGYN_ALL_OB_FT
EFM reviewed
Patient seen at bedside  E 8/90/-2
Speculum done for HSV serology, patient is on valtrax since 36 weeks  no lesions seen  VE: 2/30/-3  Cook balloon placed 80 to tension  FHT : baseline 145 bpm, moderate variability, +accels, -decels  [place]: no ctx  Plan to start pitocin
Patient seen at bedside resting comfortably with epidural in place  SVE 5/50/-3
Patient seen at bedside after [place] balloon out.

## 2024-05-31 NOTE — OB RN DELIVERY SUMMARY - NS_SEPSISRSKCALC_OBGYN_ALL_OB_FT
EOS calculated successfully. EOS Risk Factor: 0.5/1000 live births ([facility] national incidence); GA=37w2d; Temp=99.2; ROM=12.683; GBS='Negative'; Antibiotics='No antibiotics or any antibiotics < 2 hrs prior to birth'   EOS calculated successfully. EOS Risk Factor: 0.5/1000 live births ([facility] national incidence); GA=37w2d; Temp=99.6; ROM=12.683; GBS='Negative'; Antibiotics='No antibiotics or any antibiotics < 2 hrs prior to birth'

## 2024-06-01 RX ORDER — IBUPROFEN 200 MG
600 TABLET ORAL EVERY 6 HOURS
Refills: 0 | Status: DISCONTINUED | OUTPATIENT
Start: 2024-06-01 | End: 2024-06-02

## 2024-06-01 RX ADMIN — Medication 600 MILLIGRAM(S): at 15:00

## 2024-06-01 RX ADMIN — Medication 975 MILLIGRAM(S): at 17:45

## 2024-06-01 RX ADMIN — Medication 600 MILLIGRAM(S): at 22:15

## 2024-06-01 RX ADMIN — Medication 975 MILLIGRAM(S): at 23:13

## 2024-06-01 RX ADMIN — Medication 600 MILLIGRAM(S): at 21:34

## 2024-06-01 RX ADMIN — Medication 975 MILLIGRAM(S): at 00:29

## 2024-06-01 RX ADMIN — Medication 975 MILLIGRAM(S): at 06:04

## 2024-06-01 RX ADMIN — Medication 1 TABLET(S): at 11:34

## 2024-06-01 RX ADMIN — Medication 975 MILLIGRAM(S): at 17:11

## 2024-06-01 RX ADMIN — Medication 600 MILLIGRAM(S): at 02:13

## 2024-06-01 RX ADMIN — Medication 975 MILLIGRAM(S): at 12:10

## 2024-06-01 RX ADMIN — Medication 600 MILLIGRAM(S): at 08:39

## 2024-06-01 RX ADMIN — Medication 975 MILLIGRAM(S): at 11:34

## 2024-06-01 RX ADMIN — Medication 600 MILLIGRAM(S): at 09:10

## 2024-06-01 RX ADMIN — Medication 600 MILLIGRAM(S): at 14:30

## 2024-06-02 ENCOUNTER — TRANSCRIPTION ENCOUNTER (OUTPATIENT)
Age: 24
End: 2024-06-02

## 2024-06-02 VITALS
HEART RATE: 85 BPM | DIASTOLIC BLOOD PRESSURE: 85 MMHG | SYSTOLIC BLOOD PRESSURE: 140 MMHG | TEMPERATURE: 98 F | OXYGEN SATURATION: 99 % | RESPIRATION RATE: 18 BRPM

## 2024-06-02 PROCEDURE — 36415 COLL VENOUS BLD VENIPUNCTURE: CPT

## 2024-06-02 PROCEDURE — 80053 COMPREHEN METABOLIC PANEL: CPT

## 2024-06-02 PROCEDURE — 85730 THROMBOPLASTIN TIME PARTIAL: CPT

## 2024-06-02 PROCEDURE — 86850 RBC ANTIBODY SCREEN: CPT

## 2024-06-02 PROCEDURE — 84550 ASSAY OF BLOOD/URIC ACID: CPT

## 2024-06-02 PROCEDURE — 83615 LACTATE (LD) (LDH) ENZYME: CPT

## 2024-06-02 PROCEDURE — 85610 PROTHROMBIN TIME: CPT

## 2024-06-02 PROCEDURE — 86780 TREPONEMA PALLIDUM: CPT

## 2024-06-02 PROCEDURE — 85384 FIBRINOGEN ACTIVITY: CPT

## 2024-06-02 PROCEDURE — 85025 COMPLETE CBC W/AUTO DIFF WBC: CPT

## 2024-06-02 PROCEDURE — 86900 BLOOD TYPING SEROLOGIC ABO: CPT

## 2024-06-02 PROCEDURE — 86901 BLOOD TYPING SEROLOGIC RH(D): CPT

## 2024-06-02 PROCEDURE — 59050 FETAL MONITOR W/REPORT: CPT

## 2024-06-02 RX ORDER — ACETAMINOPHEN 500 MG
3 TABLET ORAL
Qty: 0 | Refills: 0 | DISCHARGE
Start: 2024-06-02

## 2024-06-02 RX ORDER — IBUPROFEN 200 MG
1 TABLET ORAL
Qty: 0 | Refills: 0 | DISCHARGE
Start: 2024-06-02

## 2024-06-02 RX ADMIN — Medication 600 MILLIGRAM(S): at 03:04

## 2024-06-02 RX ADMIN — Medication 975 MILLIGRAM(S): at 00:01

## 2024-06-02 RX ADMIN — Medication 600 MILLIGRAM(S): at 10:22

## 2024-06-02 RX ADMIN — Medication 975 MILLIGRAM(S): at 07:06

## 2024-06-02 RX ADMIN — Medication 975 MILLIGRAM(S): at 06:18

## 2024-06-02 RX ADMIN — Medication 600 MILLIGRAM(S): at 04:00

## 2024-06-02 NOTE — DISCHARGE NOTE OB - PATIENT PORTAL LINK FT
You can access the FollowMyHealth Patient Portal offered by [facility] by registering at the following website: [email or web address]. By joining [employer]’s FollowMyHealth portal, you will also be able to view your health information using other applications (apps) compatible with our system.

## 2024-06-02 NOTE — PROGRESS NOTE ADULT - ASSESSMENT
Refill Protocol Appointment Criteria: Refilled per protocol    · Appointment scheduled in the past 6 months or in the next 3 months  Recent Outpatient Visits            3 weeks ago Pain due to onychomycosis of toenail of left foot    [facility]
A/P 24y s/p  complicated by chronic hypertension, PPD1 , stable, meeting postpartum milestones   - CV: chronic hypertension. Patient has been normotensive to mild range. Denies toxic complaints.   - Pain: well controlled on tylenol/motrin  - GI: Tolerating regular diet  - : urinating without difficulty/pain  - DVT prophylaxis: ambulating frequently  - Dispo: PPD 2, unless otherwise specified    
A/P 24y s/p , PPD2 w/ cHTN , stable, meeting postpartum milestones   - Pain: well controlled on tylenol/motrin  - GI: Tolerating regular diet  - : urinating without difficulty/pain  - DVT prophylaxis: ambulating frequently  - Dispo: PPD 2, unless otherwise specified    cHTN  -no toxic complaints  -mild range BPs overnight, consider starting antihypertensives if mild range BPs persist

## 2024-06-02 NOTE — DISCHARGE NOTE OB - MEDICATION SUMMARY - MEDICATIONS TO TAKE
I will START or STAY ON the medications listed below when I get home from the hospital:    ibuprofen 600 mg oral tablet  -- 1 tab(s) by mouth every 6 hours  -- Indication: For Pain    acetaminophen 325 mg oral tablet  -- 3 tab(s) by mouth every 6 hours  -- Indication: For Pain    Prenatal Multivitamins with Folic Acid 1 mg oral tablet  -- 1 tab(s) by mouth once a day  -- Indication: For Breastfeeding

## 2024-06-02 NOTE — PROGRESS NOTE ADULT - SUBJECTIVE AND OBJECTIVE BOX
Patient evaluated at bedside this morning, resting comfortable in bed, no acute events overnight.  She reports pain is well controlled with tylenol and motrin.  She denies headache, dizziness, chest pain, palpitations, shortness of breath, nausea, vomiting, fever, chills, heavy vaginal bleeding. She has been ambulating without assistance, voiding spontaneously.  Tolerating food well, without nausea/vomit.      Physical Exam:  T(C): 36.8 (06-01-24 @ 02:19), Max: 37.1 (05-31-24 @ 20:30)  HR: 93 (06-01-24 @ 02:19) (91 - 121)  BP: 139/80 (06-01-24 @ 02:19) (132/67 - 151/69)  RR: 18 (06-01-24 @ 02:19) (16 - 18)  SpO2: 97% (06-01-24 @ 02:19) (97% - 100%)    GA: lying comfortably in bed, NAD  Pulm: no increased work of breathing  Abd: soft, nontender, nondistended, no rebound or guarding, uterus firm.  Extremities: no calf tenderness                          11.0   16.25 )-----------( 321      ( 30 May 2024 22:41 )             35.0     05-30    141  |  109<H>  |  9   ----------------------------<  95  4.0   |  20<L>  |  0.50    Ca    9.8      30 May 2024 22:41    TPro  6.6  /  Alb  3.8  /  TBili  0.3  /  DBili  x   /  AST  14  /  ALT  6<L>  /  AlkPhos  177<H>  05-30    acetaminophen     Tablet .. 975 milliGRAM(s) Oral <User Schedule>  benzocaine 20%/menthol 0.5% Spray 1 Spray(s) Topical every 6 hours PRN  dibucaine 1% Ointment 1 Application(s) Topical every 6 hours PRN  diphenhydrAMINE 25 milliGRAM(s) Oral every 6 hours PRN  diphtheria/tetanus/pertussis (acellular) Vaccine (Adacel) 0.5 milliLiter(s) IntraMuscular once  hydrocortisone 1% Cream 1 Application(s) Topical every 6 hours PRN  ibuprofen  Tablet. 600 milliGRAM(s) Oral every 6 hours  lanolin Ointment 1 Application(s) Topical every 6 hours PRN  magnesium hydroxide Suspension 30 milliLiter(s) Oral two times a day PRN  oxyCODONE    IR 5 milliGRAM(s) Oral every 3 hours PRN  oxyCODONE    IR 5 milliGRAM(s) Oral once PRN  oxytocin Infusion 41.667 milliUNIT(s)/Min IV Continuous <Continuous>  pramoxine 1%/zinc 5% Cream 1 Application(s) Topical every 4 hours PRN  prenatal multivitamin 1 Tablet(s) Oral daily  simethicone 80 milliGRAM(s) Chew every 4 hours PRN  sodium chloride 0.9% lock flush 3 milliLiter(s) IV Push every 8 hours  witch hazel Pads 1 Application(s) Topical every 4 hours PRN  
Patient evaluated at bedside this morning, resting comfortable in bed, no acute events overnight.  She reports pain is well controlled with tylenol and motrin.  She denies headache, dizziness, chest pain, palpitations, shortness of breath, nausea, vomiting, fever, chills, heavy vaginal bleeding. She has been ambulating without assistance, voiding spontaneously.  Tolerating food well, without nausea/vomit.      Physical Exam:  T(C): 36.6 (06-02-24 @ 02:00), Max: 36.7 (06-01-24 @ 22:15)  HR: 84 (06-02-24 @ 02:00) (84 - 89)  BP: 146/87 (06-02-24 @ 02:00) (138/91 - 146/87)  RR: 18 (06-02-24 @ 02:00) (18 - 18)  SpO2: 97% (06-02-24 @ 02:00) (97% - 98%)    GA: NAD, A&O x 3  Pulm: no increased work of breathing  Abd: soft, nontender, nondistended, no rebound or guarding, uterus firm.  Extremities: no swelling or calf tenderness            acetaminophen     Tablet .. 975 milliGRAM(s) Oral <User Schedule>  benzocaine 20%/menthol 0.5% Spray 1 Spray(s) Topical every 6 hours PRN  dibucaine 1% Ointment 1 Application(s) Topical every 6 hours PRN  diphenhydrAMINE 25 milliGRAM(s) Oral every 6 hours PRN  diphtheria/tetanus/pertussis (acellular) Vaccine (Adacel) 0.5 milliLiter(s) IntraMuscular once  hydrocortisone 1% Cream 1 Application(s) Topical every 6 hours PRN  ibuprofen  Tablet. 600 milliGRAM(s) Oral every 6 hours  lanolin Ointment 1 Application(s) Topical every 6 hours PRN  magnesium hydroxide Suspension 30 milliLiter(s) Oral two times a day PRN  oxyCODONE    IR 5 milliGRAM(s) Oral every 3 hours PRN  oxyCODONE    IR 5 milliGRAM(s) Oral once PRN  oxytocin Infusion 41.667 milliUNIT(s)/Min IV Continuous <Continuous>  pramoxine 1%/zinc 5% Cream 1 Application(s) Topical every 4 hours PRN  prenatal multivitamin 1 Tablet(s) Oral daily  simethicone 80 milliGRAM(s) Chew every 4 hours PRN  sodium chloride 0.9% lock flush 3 milliLiter(s) IV Push every 8 hours  witch hazel Pads 1 Application(s) Topical every 4 hours PRN

## 2024-06-02 NOTE — DISCHARGE NOTE OB - CARE PLAN
Principal Discharge DX:	Pregnancy, delivered  Assessment and plan of treatment:	- Follow up in the office with your doctor in 6 weeks. Please call to confirm your appointment.  - You can eat a regular diet.  - Take 600mg Mortin every 6 hours and/or Tylenol 975mg every 6 hours as needed for pain.  - No heavy lifting and nothing in the vagina until cleared by your doctor.  - Please call your OBGYN with any questions or concerns.  Secondary Diagnosis:	Chronic hypertension  Assessment and plan of treatment:	- Follow up with your OBGYN in 1 week for a blood pressure check.  - Purchase electronic blood pressure cuff at your local pharmacy. Check blood pressure 3x per day.  - If systolic BP (top number) is greater than 160 OR diastolic BP (bottom number) is greater than 110, you develop a headache not relieved by Tylenol, visual disturbances, or right upper abdominal pain, then call your OBGYN or the hospital, or go to your nearest emergency room.  - Regular diet and normal activity as tolerated.  - Nothing in the vagina for 6 weeks (i.e., no sex, tampons, tub baths, or swimming pools)   1

## 2024-06-02 NOTE — DISCHARGE NOTE OB - CARE PROVIDER_API CALL
Prasanna Dia  Obstetrics and Gynecology  [address] [place] [id number]  Phone: [phone]  Fax: [phone]  Follow Up Time:

## 2024-06-02 NOTE — DISCHARGE NOTE OB - MEDICATION SUMMARY - MEDICATIONS TO STOP TAKING
I will STOP taking the medications listed below when I get home from the hospital:    Baby aspirin 162 mg d/c at 35 wks    Baby aspirin 162 mg d/c at 35 wks    Valtrex (non compliant "I took it only once")    Valtrex (non compliant "I took it only once"

## 2024-06-06 ENCOUNTER — TRANSCRIPTION ENCOUNTER (OUTPATIENT)
Age: 24
End: 2024-06-06

## 2024-06-07 DIAGNOSIS — Z28.09 IMMUNIZATION NOT CARRIED OUT BECAUSE OF OTHER CONTRAINDICATION: ICD-10-CM

## 2024-06-07 DIAGNOSIS — Z3A.37 37 WEEKS GESTATION OF PREGNANCY: ICD-10-CM

## 2024-06-07 DIAGNOSIS — B00.9 HERPESVIRAL INFECTION, UNSPECIFIED: ICD-10-CM

## 2024-06-07 DIAGNOSIS — Z91.148 PATIENT'S OTHER NONCOMPLIANCE WITH MEDICATION REGIMEN FOR OTHER REASON: ICD-10-CM

## 2024-06-25 ENCOUNTER — TRANSCRIPTION ENCOUNTER (OUTPATIENT)
Age: 24
End: 2024-06-25

## 2025-04-03 NOTE — OB RN PATIENT PROFILE - AS SC BRADEN SENSORY
Encounter addended by: Nati Humphrey RN on: 4/3/2025 7:57 AM   Actions taken: Patient Education assessment filed, Patient Education title added, Patient Education point added, Patient Education documented on
(4) no impairment

## 2025-04-11 NOTE — DISCHARGE NOTE OB - IF BREASTFEEDING, ADD A TOTAL OF 500 EXTRA CALORIES EACH DAY
Pt here for CKD f/u with Dr Colón. Assessment deferred to MD. Pt and wife deny any complaints/questions, declining  during visit.    Concerns/questions:   - recently put on allopurinol d/t gout  - litholink done 11/2024, asked to increase citrus fruits  - to check uric acid q3 mo and continue low purine diet    Baseline creat: ~1.8    Plan per MD:  - consider cost of K Citrate 10 mEq daily, if affordable use this and stop sodium bicarb and recheck litholink 2-3 months later. Otherwise continue sodium bicarb  - f/u in 6 mo    Per MA, pt reported not taking allopurinol, but pt and wife reported to RN was taking as prescribed. Dtr also confirmed pt is taking as prescribed.    Statement Selected